# Patient Record
Sex: FEMALE | Race: BLACK OR AFRICAN AMERICAN | NOT HISPANIC OR LATINO
[De-identification: names, ages, dates, MRNs, and addresses within clinical notes are randomized per-mention and may not be internally consistent; named-entity substitution may affect disease eponyms.]

---

## 2017-12-06 ENCOUNTER — RESULT REVIEW (OUTPATIENT)
Age: 57
End: 2017-12-06

## 2018-02-26 ENCOUNTER — TRANSCRIPTION ENCOUNTER (OUTPATIENT)
Age: 58
End: 2018-02-26

## 2018-02-26 ENCOUNTER — EMERGENCY (EMERGENCY)
Facility: HOSPITAL | Age: 58
LOS: 1 days | Discharge: HOME | End: 2018-02-26
Attending: EMERGENCY MEDICINE | Admitting: EMERGENCY MEDICINE
Payer: OTHER MISCELLANEOUS

## 2018-02-26 VITALS
OXYGEN SATURATION: 98 % | SYSTOLIC BLOOD PRESSURE: 153 MMHG | TEMPERATURE: 98 F | HEART RATE: 93 BPM | DIASTOLIC BLOOD PRESSURE: 92 MMHG | RESPIRATION RATE: 18 BRPM

## 2018-02-26 DIAGNOSIS — Z98.89 OTHER SPECIFIED POSTPROCEDURAL STATES: Chronic | ICD-10-CM

## 2018-02-26 PROCEDURE — 73502 X-RAY EXAM HIP UNI 2-3 VIEWS: CPT | Mod: 26,RT

## 2018-02-26 PROCEDURE — 99284 EMERGENCY DEPT VISIT MOD MDM: CPT

## 2018-02-26 NOTE — ED ADULT TRIAGE NOTE - CHIEF COMPLAINT QUOTE
Pt complaining of R hip pain s/p fall at work today. 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 Pt denies any pain at this time, did not hit her head, no LOC.

## 2018-02-26 NOTE — ED PROVIDER NOTE - OBJECTIVE STATEMENT
58 y/o F w/ PMhx of HTN, presents to the ED c/o right hip pain s/p trip and fall at work. 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 Pt is currently ambulatory. Denies LOC, head trauma, numbness/tingling, weakness or any other complaints.

## 2018-02-26 NOTE — ED PROVIDER NOTE - PSH
(normal spontaneous vaginal delivery)  x 3  S/P D&C (status post dilation and curettage)    S/P tonsillectomy    Termination of pregnancy (fetus)

## 2018-02-26 NOTE — ED PROVIDER NOTE - PROGRESS NOTE DETAILS
JOSE MARIN: received pt s/p Quids eval by Dr. Madrid to f/u HIP & Pelvis X-ray, if neg imaging, can dispo home to f/u with PCP. PA TANIA: X-ray neg, pt doesn't want to pain med at this time. Pt can dispo home and f/u with PCP. The patient was given verbal and written discharge instructions. Specifically, instructions when to return to the ED and when to seek follow-up from their pcp was discussed. Any specialty follow-up was discussed, including how to make an appointment.  Instructions were discussed in simple, plain language and was understood by the patient. The patient understands that should their symptoms worsen or any new symptoms arise, they should return to the ED immediately for further evaluation. All pt's questions were answered. Patient verbalizes understanding.

## 2018-02-26 NOTE — ED PROVIDER NOTE - PLAN OF CARE
Rest, drink plenty of fluids.  Advance activity as tolerated.  Continue all previously prescribed medications as directed.  Take Tylenol 650mg (Two 325 mg pills) every 4-6 hours as needed for pain. Follow up with your primary care physician in 48-72 hours- bring copies of your results.  Return to the ER for worsening or persistent symptoms, and/or ANY NEW OR CONCERNING SYMPTOMS. If you have issues obtaining follow up, please call: [phone] to obtain a doctor or specialist who takes your insurance in your area.

## 2018-02-26 NOTE — ED PROVIDER NOTE - CARE PLAN
Principal Discharge DX:	Hip pain, acute, right  Assessment and plan of treatment:	Rest, drink plenty of fluids.  Advance activity as tolerated.  Continue all previously prescribed medications as directed.  Take Tylenol 650mg (Two 325 mg pills) every 4-6 hours as needed for pain. Follow up with your primary care physician in 48-72 hours- bring copies of your results.  Return to the ER for worsening or persistent symptoms, and/or ANY NEW OR CONCERNING SYMPTOMS. If you have issues obtaining follow up, please call: [phone] to obtain a doctor or specialist who takes your insurance in your area.

## 2024-12-06 NOTE — ED ADULT TRIAGE NOTE - AS TEMP SITE
[FreeTextEntry3] : I, Lana Vieira, oral